# Patient Record
Sex: FEMALE | URBAN - METROPOLITAN AREA
[De-identification: names, ages, dates, MRNs, and addresses within clinical notes are randomized per-mention and may not be internally consistent; named-entity substitution may affect disease eponyms.]

---

## 2018-06-08 ENCOUNTER — TELEPHONE (OUTPATIENT)
Dept: FAMILY MEDICINE CLINIC | Age: 48
End: 2018-06-08

## 2018-06-08 NOTE — TELEPHONE ENCOUNTER
----- Message from Juanita Kennedy MD sent at 6/8/2018  8:53 AM EDT -----  Please contact pt regarding mammogram result we received today. I have seen pt last in July 2016. Had ordered mammogram at that time and never got done. Not seen pt since then. Please contact pt and see if we are still PCP and if someone else please ask radiology to sen result there and if we are ask pt where mammogram was done and need a follow up appt and also need diagnostic mammogram to be done if no prior mammogram got done. Thanks.    Delmis Zaragoza

## 2018-06-08 NOTE — TELEPHONE ENCOUNTER
Contacted patient and verified identity using name and date of birth (2- identifiers)  Spoke with patient and she indicated that she has not changed PCP's but that she got the mammogram done free through [facility] as she does not have any insurance benefits. I did advise about [employer] and Community discounts for cash/self pay.  She scheduled appt for 7/3/18 @11:30

## 2018-06-14 ENCOUNTER — TELEPHONE (OUTPATIENT)
Dept: FAMILY MEDICINE CLINIC | Age: 48
End: 2018-06-14

## 2018-06-14 NOTE — TELEPHONE ENCOUNTER
Called patient and she did indicate that Octavio Jarquin already called her and scheduled her for the additional images. Patient will call back afterwards to schedule follow-up.

## 2018-06-14 NOTE — TELEPHONE ENCOUNTER
Spoke with Jocelyne Nguyen at [facility] regarding this patient's abnormal mammogram results. She indicated that the orders were placed by [facility] and the way the order is made is to include any additional images if required. Jocelyne Nguyen indicated that she would send the information back to [place] and will contact the patient to get the additional images needed to complete the mammogram done.

## 2018-07-31 ENCOUNTER — TELEPHONE (OUTPATIENT)
Dept: FAMILY MEDICINE CLINIC | Age: 48
End: 2018-07-31
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
## 2018-08-01 ENCOUNTER — TELEPHONE (OUTPATIENT)
Dept: FAMILY MEDICINE CLINIC | Age: 48
End: 2018-08-01

## 2019-07-03 ENCOUNTER — TELEPHONE (OUTPATIENT)
Dept: FAMILY MEDICINE CLINIC | Age: 49
End: 2019-07-03

## 2019-07-08 ENCOUNTER — TELEPHONE (OUTPATIENT)
Dept: FAMILY MEDICINE CLINIC | Age: 49
End: 2019-07-08

## 2019-07-08 NOTE — TELEPHONE ENCOUNTER
Pt is part of the [facility] and that needs to be added to her order That she is a part of that group. Pt is coming in today. Please call [facility] if you have questions.

## 2019-07-08 NOTE — TELEPHONE ENCOUNTER
This call was from [facility]. Called and left message for their department to please call me.